# Patient Record
Sex: MALE | Race: WHITE | Employment: OTHER | URBAN - METROPOLITAN AREA
[De-identification: names, ages, dates, MRNs, and addresses within clinical notes are randomized per-mention and may not be internally consistent; named-entity substitution may affect disease eponyms.]

---

## 2017-04-21 ENCOUNTER — OFFICE VISIT (OUTPATIENT)
Dept: PULMONOLOGY | Age: 77
End: 2017-04-21

## 2017-04-21 VITALS
RESPIRATION RATE: 20 BRPM | OXYGEN SATURATION: 97 % | DIASTOLIC BLOOD PRESSURE: 67 MMHG | BODY MASS INDEX: 25.58 KG/M2 | HEART RATE: 86 BPM | WEIGHT: 173.2 LBS | SYSTOLIC BLOOD PRESSURE: 108 MMHG

## 2017-04-21 DIAGNOSIS — R91.1 PULMONARY NODULE: ICD-10-CM

## 2017-04-21 DIAGNOSIS — R05.4 COUGH SYNCOPE: ICD-10-CM

## 2017-04-21 DIAGNOSIS — Z72.0 TOBACCO ABUSE: ICD-10-CM

## 2017-04-21 DIAGNOSIS — J96.11 CHRONIC RESPIRATORY FAILURE WITH HYPOXIA (HCC): ICD-10-CM

## 2017-04-21 DIAGNOSIS — R55 COUGH SYNCOPE: ICD-10-CM

## 2017-04-21 DIAGNOSIS — J44.9 COPD, VERY SEVERE (HCC): Primary | ICD-10-CM

## 2017-04-21 PROCEDURE — G8926 SPIRO NO PERF OR DOC: HCPCS | Performed by: NURSE PRACTITIONER

## 2017-04-21 PROCEDURE — 3023F SPIROM DOC REV: CPT | Performed by: NURSE PRACTITIONER

## 2017-04-21 PROCEDURE — G8420 CALC BMI NORM PARAMETERS: HCPCS | Performed by: NURSE PRACTITIONER

## 2017-04-21 PROCEDURE — 1123F ACP DISCUSS/DSCN MKR DOCD: CPT | Performed by: NURSE PRACTITIONER

## 2017-04-21 PROCEDURE — 99214 OFFICE O/P EST MOD 30 MIN: CPT | Performed by: NURSE PRACTITIONER

## 2017-04-21 PROCEDURE — 4040F PNEUMOC VAC/ADMIN/RCVD: CPT | Performed by: NURSE PRACTITIONER

## 2017-04-21 PROCEDURE — G8427 DOCREV CUR MEDS BY ELIG CLIN: HCPCS | Performed by: NURSE PRACTITIONER

## 2017-04-21 PROCEDURE — 4004F PT TOBACCO SCREEN RCVD TLK: CPT | Performed by: NURSE PRACTITIONER

## 2017-04-21 RX ORDER — VARENICLINE TARTRATE 25 MG
KIT ORAL
Qty: 60 TABLET | Refills: 0 | Status: SHIPPED | OUTPATIENT
Start: 2017-04-21 | End: 2018-09-28 | Stop reason: ALTCHOICE

## 2017-04-21 RX ORDER — IPRATROPIUM BROMIDE AND ALBUTEROL SULFATE 2.5; .5 MG/3ML; MG/3ML
1 SOLUTION RESPIRATORY (INHALATION) EVERY 4 HOURS PRN
Qty: 360 ML | Refills: 5 | Status: SHIPPED | OUTPATIENT
Start: 2017-04-21 | End: 2019-01-02 | Stop reason: SDUPTHER

## 2017-04-21 RX ORDER — TIOTROPIUM BROMIDE 18 UG/1
18 CAPSULE ORAL; RESPIRATORY (INHALATION) DAILY
Qty: 30 CAPSULE | Refills: 6 | Status: SHIPPED | OUTPATIENT
Start: 2017-04-21 | End: 2018-09-28

## 2017-04-21 RX ORDER — BENZONATATE 100 MG/1
100 CAPSULE ORAL 3 TIMES DAILY PRN
Qty: 90 CAPSULE | Refills: 0 | Status: SHIPPED | OUTPATIENT
Start: 2017-04-21 | End: 2017-04-28

## 2017-05-10 ENCOUNTER — TELEPHONE (OUTPATIENT)
Dept: PULMONOLOGY | Age: 77
End: 2017-05-10

## 2018-09-28 ENCOUNTER — OFFICE VISIT (OUTPATIENT)
Dept: PULMONOLOGY | Age: 78
End: 2018-09-28
Payer: MEDICARE

## 2018-09-28 VITALS
DIASTOLIC BLOOD PRESSURE: 68 MMHG | WEIGHT: 173 LBS | BODY MASS INDEX: 25.55 KG/M2 | OXYGEN SATURATION: 95 % | SYSTOLIC BLOOD PRESSURE: 118 MMHG | HEART RATE: 85 BPM

## 2018-09-28 DIAGNOSIS — J96.11 CHRONIC RESPIRATORY FAILURE WITH HYPOXIA (HCC): ICD-10-CM

## 2018-09-28 DIAGNOSIS — J43.2 CENTRILOBULAR EMPHYSEMA (HCC): ICD-10-CM

## 2018-09-28 DIAGNOSIS — J44.9 COPD, VERY SEVERE (HCC): ICD-10-CM

## 2018-09-28 DIAGNOSIS — R06.02 SHORTNESS OF BREATH: Primary | ICD-10-CM

## 2018-09-28 DIAGNOSIS — Z72.0 TOBACCO ABUSE: ICD-10-CM

## 2018-09-28 DIAGNOSIS — R91.1 PULMONARY NODULE: ICD-10-CM

## 2018-09-28 PROCEDURE — 1101F PT FALLS ASSESS-DOCD LE1/YR: CPT | Performed by: INTERNAL MEDICINE

## 2018-09-28 PROCEDURE — 3023F SPIROM DOC REV: CPT | Performed by: INTERNAL MEDICINE

## 2018-09-28 PROCEDURE — 4004F PT TOBACCO SCREEN RCVD TLK: CPT | Performed by: INTERNAL MEDICINE

## 2018-09-28 PROCEDURE — 4040F PNEUMOC VAC/ADMIN/RCVD: CPT | Performed by: INTERNAL MEDICINE

## 2018-09-28 PROCEDURE — G8427 DOCREV CUR MEDS BY ELIG CLIN: HCPCS | Performed by: INTERNAL MEDICINE

## 2018-09-28 PROCEDURE — 1123F ACP DISCUSS/DSCN MKR DOCD: CPT | Performed by: INTERNAL MEDICINE

## 2018-09-28 PROCEDURE — G8419 CALC BMI OUT NRM PARAM NOF/U: HCPCS | Performed by: INTERNAL MEDICINE

## 2018-09-28 PROCEDURE — 99214 OFFICE O/P EST MOD 30 MIN: CPT | Performed by: INTERNAL MEDICINE

## 2018-09-28 PROCEDURE — G8926 SPIRO NO PERF OR DOC: HCPCS | Performed by: INTERNAL MEDICINE

## 2018-09-28 RX ORDER — TAMSULOSIN HYDROCHLORIDE 0.4 MG/1
1 CAPSULE ORAL DAILY
Refills: 0 | COMMUNITY
Start: 2018-09-20

## 2018-09-28 RX ORDER — GLIMEPIRIDE 2 MG/1
1 TABLET ORAL DAILY
Refills: 0 | COMMUNITY
Start: 2018-09-20 | End: 2019-02-18 | Stop reason: ALTCHOICE

## 2018-09-28 RX ORDER — LISINOPRIL AND HYDROCHLOROTHIAZIDE 12.5; 1 MG/1; MG/1
1 TABLET ORAL DAILY
Refills: 0 | COMMUNITY
Start: 2018-09-20 | End: 2019-02-18 | Stop reason: DRUGHIGH

## 2018-09-28 NOTE — LETTER
[facility], [address] [facility], [address] [place] [address]  Phone: [phone]  Fax: [phone]    No ref. provider found        September 28, 2018       Patient: Bladimir Au   MR Number: [id number]   YOB: 1940   Date of Visit: 9/28/2018       Dear Dr. Romi Vizcarra ref. provider found: Thank you for the request for consultation for Larissa Rocha to me for the evaluation of Severe COPD. Below are the relevant portions of my assessment and plan of care. If you have questions, please do not hesitate to call me. I look forward to following Gail Dee along with you.     Sincerely,        Lexi Manzo MD    CC providers:  No Recipients

## 2018-09-28 NOTE — PROGRESS NOTES
[facility]  Progress Note  Rhoda Bianchi MD       Renard Alert   YOB: 1940    Date of Visit:  9/28/2018    Assessment/Plan:  1. COPD, very severe ([address]  PFT:  INTERPRETATION: Spirometry showed normal FVC of 2.87 L, 81% of predicted,  and decreased FEV1 of 1.02 L, 37% predicted. FEV1/FVC ration was decreased  at 36%. No response to bronchodilators demonstrated on spirometry. Lung volumes showed increased total lung capacity of 157% predicted and  increased residual volume of 236% predicted. Diffusion capacity showed  normal DLCO of 79% predicted. IMPRESSION: Very severe obstructive defect with no response to  bronchodilators on spirometry. Both air trapping and hyperinflation were  present on lung volumes and normal diffusion capacity.  - Internal Referral to Pulmonary Rehab    Advair ==> in the [facility] and can't afford it. Give hime some samples and refer to [address] [place] [address] program.  Using Duoneb TID (partial treatments) and feels this helps him the most  Stop Spiriva  Albuterol    2. Chronic respiratory failure with hypoxia (HCC)  O2 sats are acceptable on supplemental O2. The patient benefits from the use of supplemental O2.      3. Centrilobular emphysema ([address]  CT chest:  IMPRESSION:   1. Mild to moderate emphysema without acute intra pulmonary findings. 2. 6 mm noncalcified nodule within the left lower lobe. While this most   likely represents a benign granuloma, further evaluation and workup is as   suggested per the Fleischner society criteria listed below. This may account   for the previously described outside chest x-ray finding. However, suggest   obtaining and direct comparison with this outside chest x-ray to characterize   the aforementioned pulmonary nodule on outside chest x-ray imaging. 4. Pulmonary nodule  CT chest:  IMPRESSION:   1. Mild to moderate emphysema without acute intra pulmonary findings.    2. 6 mm noncalcified

## 2019-01-02 ENCOUNTER — OFFICE VISIT (OUTPATIENT)
Dept: PULMONOLOGY | Age: 79
End: 2019-01-02
Payer: MEDICARE

## 2019-01-02 VITALS
BODY MASS INDEX: 23.78 KG/M2 | DIASTOLIC BLOOD PRESSURE: 68 MMHG | SYSTOLIC BLOOD PRESSURE: 100 MMHG | OXYGEN SATURATION: 95 % | WEIGHT: 161 LBS | HEART RATE: 86 BPM

## 2019-01-02 DIAGNOSIS — Z72.0 TOBACCO ABUSE: ICD-10-CM

## 2019-01-02 DIAGNOSIS — J96.11 CHRONIC RESPIRATORY FAILURE WITH HYPOXIA (HCC): ICD-10-CM

## 2019-01-02 DIAGNOSIS — R91.1 PULMONARY NODULE: ICD-10-CM

## 2019-01-02 DIAGNOSIS — J44.9 COPD, VERY SEVERE (HCC): Primary | ICD-10-CM

## 2019-01-02 DIAGNOSIS — J43.2 CENTRILOBULAR EMPHYSEMA (HCC): ICD-10-CM

## 2019-01-02 PROCEDURE — 99214 OFFICE O/P EST MOD 30 MIN: CPT | Performed by: INTERNAL MEDICINE

## 2019-01-02 PROCEDURE — 4004F PT TOBACCO SCREEN RCVD TLK: CPT | Performed by: INTERNAL MEDICINE

## 2019-01-02 PROCEDURE — 1101F PT FALLS ASSESS-DOCD LE1/YR: CPT | Performed by: INTERNAL MEDICINE

## 2019-01-02 PROCEDURE — G8420 CALC BMI NORM PARAMETERS: HCPCS | Performed by: INTERNAL MEDICINE

## 2019-01-02 PROCEDURE — G8484 FLU IMMUNIZE NO ADMIN: HCPCS | Performed by: INTERNAL MEDICINE

## 2019-01-02 PROCEDURE — 3023F SPIROM DOC REV: CPT | Performed by: INTERNAL MEDICINE

## 2019-01-02 PROCEDURE — G8926 SPIRO NO PERF OR DOC: HCPCS | Performed by: INTERNAL MEDICINE

## 2019-01-02 PROCEDURE — G8427 DOCREV CUR MEDS BY ELIG CLIN: HCPCS | Performed by: INTERNAL MEDICINE

## 2019-01-02 PROCEDURE — 4040F PNEUMOC VAC/ADMIN/RCVD: CPT | Performed by: INTERNAL MEDICINE

## 2019-01-02 PROCEDURE — 1123F ACP DISCUSS/DSCN MKR DOCD: CPT | Performed by: INTERNAL MEDICINE

## 2019-01-02 RX ORDER — BUDESONIDE 0.5 MG/2ML
500 INHALANT ORAL 2 TIMES DAILY
Qty: 60 AMPULE | Refills: 11 | Status: CANCELLED | OUTPATIENT
Start: 2019-01-02 | End: 2020-01-02

## 2019-01-02 RX ORDER — FORMOTEROL FUMARATE 20 UG/2ML
20 SOLUTION RESPIRATORY (INHALATION) 2 TIMES DAILY
Qty: 120 ML | Refills: 11 | Status: SHIPPED | OUTPATIENT
Start: 2019-01-02 | End: 2019-02-18

## 2019-01-02 RX ORDER — FORMOTEROL FUMARATE 20 UG/2ML
20 SOLUTION RESPIRATORY (INHALATION) 2 TIMES DAILY
Qty: 120 ML | Refills: 11 | Status: SHIPPED | OUTPATIENT
Start: 2019-01-02 | End: 2019-01-02 | Stop reason: SDUPTHER

## 2019-01-02 RX ORDER — FORMOTEROL FUMARATE 20 UG/2ML
20 SOLUTION RESPIRATORY (INHALATION) 2 TIMES DAILY
Qty: 120 ML | Refills: 11 | Status: SHIPPED | OUTPATIENT
Start: 2019-01-02 | End: 2019-01-04 | Stop reason: SDUPTHER

## 2019-01-02 RX ORDER — BUDESONIDE 0.5 MG/2ML
500 INHALANT ORAL 2 TIMES DAILY
Qty: 60 AMPULE | Refills: 11 | Status: SHIPPED | OUTPATIENT
Start: 2019-01-02 | End: 2019-01-04 | Stop reason: SDUPTHER

## 2019-01-02 RX ORDER — IPRATROPIUM BROMIDE AND ALBUTEROL SULFATE 2.5; .5 MG/3ML; MG/3ML
1 SOLUTION RESPIRATORY (INHALATION) EVERY 4 HOURS PRN
Qty: 360 ML | Refills: 11 | Status: SHIPPED | OUTPATIENT
Start: 2019-01-02

## 2019-01-02 RX ORDER — FORMOTEROL FUMARATE 20 UG/2ML
20 SOLUTION RESPIRATORY (INHALATION) 2 TIMES DAILY
Qty: 120 ML | Refills: 11 | Status: CANCELLED | OUTPATIENT
Start: 2019-01-02

## 2019-01-04 ENCOUNTER — TELEPHONE (OUTPATIENT)
Dept: PULMONOLOGY | Age: 79
End: 2019-01-04

## 2019-01-04 RX ORDER — BUDESONIDE 0.5 MG/2ML
500 INHALANT ORAL 2 TIMES DAILY
Qty: 60 AMPULE | Refills: 11 | Status: SHIPPED | OUTPATIENT
Start: 2019-01-04 | End: 2019-02-18

## 2019-01-04 RX ORDER — FORMOTEROL FUMARATE 20 UG/2ML
20 SOLUTION RESPIRATORY (INHALATION) 2 TIMES DAILY
Qty: 120 ML | Refills: 11 | Status: SHIPPED | OUTPATIENT
Start: 2019-01-04 | End: 2019-02-18

## 2019-02-11 ENCOUNTER — TELEPHONE (OUTPATIENT)
Dept: PULMONOLOGY | Age: 79
End: 2019-02-11

## 2019-02-18 ENCOUNTER — OFFICE VISIT (OUTPATIENT)
Dept: PULMONOLOGY | Age: 79
End: 2019-02-18
Payer: MEDICARE

## 2019-02-18 VITALS — SYSTOLIC BLOOD PRESSURE: 98 MMHG | OXYGEN SATURATION: 90 % | DIASTOLIC BLOOD PRESSURE: 56 MMHG | HEART RATE: 91 BPM

## 2019-02-18 DIAGNOSIS — J43.2 CENTRILOBULAR EMPHYSEMA (HCC): ICD-10-CM

## 2019-02-18 DIAGNOSIS — J44.9 COPD, VERY SEVERE (HCC): ICD-10-CM

## 2019-02-18 DIAGNOSIS — I10 ESSENTIAL HYPERTENSION: ICD-10-CM

## 2019-02-18 DIAGNOSIS — R91.1 PULMONARY NODULE: ICD-10-CM

## 2019-02-18 DIAGNOSIS — J96.11 CHRONIC RESPIRATORY FAILURE WITH HYPOXIA (HCC): ICD-10-CM

## 2019-02-18 DIAGNOSIS — R06.02 SHORTNESS OF BREATH: Primary | ICD-10-CM

## 2019-02-18 PROCEDURE — 4004F PT TOBACCO SCREEN RCVD TLK: CPT | Performed by: INTERNAL MEDICINE

## 2019-02-18 PROCEDURE — 1101F PT FALLS ASSESS-DOCD LE1/YR: CPT | Performed by: INTERNAL MEDICINE

## 2019-02-18 PROCEDURE — G8427 DOCREV CUR MEDS BY ELIG CLIN: HCPCS | Performed by: INTERNAL MEDICINE

## 2019-02-18 PROCEDURE — 4040F PNEUMOC VAC/ADMIN/RCVD: CPT | Performed by: INTERNAL MEDICINE

## 2019-02-18 PROCEDURE — 1123F ACP DISCUSS/DSCN MKR DOCD: CPT | Performed by: INTERNAL MEDICINE

## 2019-02-18 PROCEDURE — 3023F SPIROM DOC REV: CPT | Performed by: INTERNAL MEDICINE

## 2019-02-18 PROCEDURE — G8484 FLU IMMUNIZE NO ADMIN: HCPCS | Performed by: INTERNAL MEDICINE

## 2019-02-18 PROCEDURE — G8926 SPIRO NO PERF OR DOC: HCPCS | Performed by: INTERNAL MEDICINE

## 2019-02-18 PROCEDURE — G8420 CALC BMI NORM PARAMETERS: HCPCS | Performed by: INTERNAL MEDICINE

## 2019-02-18 PROCEDURE — 99214 OFFICE O/P EST MOD 30 MIN: CPT | Performed by: INTERNAL MEDICINE

## 2019-02-18 RX ORDER — ALBUTEROL SULFATE 90 UG/1
2 AEROSOL, METERED RESPIRATORY (INHALATION) EVERY 6 HOURS PRN
Qty: 1 INHALER | Refills: 11 | Status: SHIPPED | OUTPATIENT
Start: 2019-02-18 | End: 2019-06-17 | Stop reason: SDUPTHER

## 2019-02-18 RX ORDER — IPRATROPIUM BROMIDE AND ALBUTEROL SULFATE 2.5; .5 MG/3ML; MG/3ML
1 SOLUTION RESPIRATORY (INHALATION) EVERY 4 HOURS
Qty: 360 ML | Refills: 11 | Status: SHIPPED | OUTPATIENT
Start: 2019-02-18 | End: 2019-06-17 | Stop reason: SDUPTHER

## 2019-02-18 RX ORDER — LISINOPRIL AND HYDROCHLOROTHIAZIDE 25; 20 MG/1; MG/1
1 TABLET ORAL DAILY
COMMUNITY
End: 2019-09-17 | Stop reason: ALTCHOICE

## 2019-02-18 RX ORDER — PANTOPRAZOLE SODIUM 40 MG/1
40 TABLET, DELAYED RELEASE ORAL 2 TIMES DAILY
COMMUNITY

## 2019-02-18 RX ORDER — ROSUVASTATIN CALCIUM 20 MG/1
20 TABLET, COATED ORAL DAILY
COMMUNITY

## 2019-06-17 ENCOUNTER — OFFICE VISIT (OUTPATIENT)
Dept: PULMONOLOGY | Age: 79
End: 2019-06-17
Payer: MEDICARE

## 2019-06-17 VITALS
SYSTOLIC BLOOD PRESSURE: 121 MMHG | HEART RATE: 78 BPM | OXYGEN SATURATION: 94 % | DIASTOLIC BLOOD PRESSURE: 68 MMHG | WEIGHT: 152 LBS | BODY MASS INDEX: 22.45 KG/M2

## 2019-06-17 DIAGNOSIS — J44.9 COPD, VERY SEVERE (HCC): Primary | ICD-10-CM

## 2019-06-17 DIAGNOSIS — Z72.0 TOBACCO ABUSE: ICD-10-CM

## 2019-06-17 DIAGNOSIS — R91.1 PULMONARY NODULE: ICD-10-CM

## 2019-06-17 DIAGNOSIS — J96.11 CHRONIC RESPIRATORY FAILURE WITH HYPOXIA (HCC): ICD-10-CM

## 2019-06-17 DIAGNOSIS — J43.2 CENTRILOBULAR EMPHYSEMA (HCC): ICD-10-CM

## 2019-06-17 PROCEDURE — G8926 SPIRO NO PERF OR DOC: HCPCS | Performed by: INTERNAL MEDICINE

## 2019-06-17 PROCEDURE — 99214 OFFICE O/P EST MOD 30 MIN: CPT | Performed by: INTERNAL MEDICINE

## 2019-06-17 PROCEDURE — 4004F PT TOBACCO SCREEN RCVD TLK: CPT | Performed by: INTERNAL MEDICINE

## 2019-06-17 PROCEDURE — G8420 CALC BMI NORM PARAMETERS: HCPCS | Performed by: INTERNAL MEDICINE

## 2019-06-17 PROCEDURE — G8427 DOCREV CUR MEDS BY ELIG CLIN: HCPCS | Performed by: INTERNAL MEDICINE

## 2019-06-17 PROCEDURE — 1123F ACP DISCUSS/DSCN MKR DOCD: CPT | Performed by: INTERNAL MEDICINE

## 2019-06-17 PROCEDURE — 4040F PNEUMOC VAC/ADMIN/RCVD: CPT | Performed by: INTERNAL MEDICINE

## 2019-06-17 PROCEDURE — 3023F SPIROM DOC REV: CPT | Performed by: INTERNAL MEDICINE

## 2019-06-17 RX ORDER — ALBUTEROL SULFATE 90 UG/1
2 AEROSOL, METERED RESPIRATORY (INHALATION) EVERY 6 HOURS PRN
Qty: 1 INHALER | Refills: 11 | Status: SHIPPED | OUTPATIENT
Start: 2019-06-17 | End: 2019-09-17 | Stop reason: SDUPTHER

## 2019-06-17 RX ORDER — IPRATROPIUM BROMIDE AND ALBUTEROL SULFATE 2.5; .5 MG/3ML; MG/3ML
1 SOLUTION RESPIRATORY (INHALATION) EVERY 4 HOURS
Qty: 360 ML | Refills: 11 | Status: SHIPPED | OUTPATIENT
Start: 2019-06-17 | End: 2021-02-04

## 2019-06-17 NOTE — PROGRESS NOTES
[facility] [place]  Progress Note  Monty Arriaga MD       Jurgen Hearn   YOB: 1940    Date of Visit:  6/17/2019    Assessment/Plan:  1. COPD, very severe ([address]  PFT 2016:  INTERPRETATION: Spirometry showed normal FVC of 2.87 L, 81% of predicted,  and decreased FEV1 of 1.02 L, 37% predicted. FEV1/FVC ration was decreased  at 36%. No response to bronchodilators demonstrated on spirometry. Lung volumes showed increased total lung capacity of 157% predicted and  increased residual volume of 236% predicted. Diffusion capacity showed  normal DLCO of 79% predicted. IMPRESSION: Very severe obstructive defect with no response to  bronchodilators on spirometry. Both air trapping and hyperinflation were  present on lung volumes and normal diffusion capacity.  - Internal Referral to Pulmonary Rehab    Advair (acces to medication is an issue 2/2 cost)  Using Duoneb QID  Albuterol    Repeat PFT was ordered but he was never able to get it done. 2. Chronic respiratory failure with hypoxia (HCC)  O2 sats are acceptable on supplemental O2. The patient benefits from the use of supplemental O2. Use O2 at 3 on his conserver when active and 2 when he is at rest. He is on 2.5 on the concentrator at home. 3. Centrilobular emphysema ([address]  CT chest:  IMPRESSION:   1. Mild to moderate emphysema without acute intra pulmonary findings. 2. 6 mm noncalcified nodule within the left lower lobe. While this most   likely represents a benign granuloma, further evaluation and workup is as   suggested per the Fleischner society criteria listed below. This may account   for the previously described outside chest x-ray finding. However, suggest   obtaining and direct comparison with this outside chest x-ray to characterize   the aforementioned pulmonary nodule on outside chest x-ray imaging. 4. Pulmonary nodule  CT chest:  IMPRESSION:   1.  Mild to moderate emphysema without

## 2019-09-17 ENCOUNTER — OFFICE VISIT (OUTPATIENT)
Dept: PULMONOLOGY | Age: 79
End: 2019-09-17
Payer: MEDICARE

## 2019-09-17 VITALS — HEART RATE: 90 BPM | SYSTOLIC BLOOD PRESSURE: 116 MMHG | OXYGEN SATURATION: 96 % | DIASTOLIC BLOOD PRESSURE: 64 MMHG

## 2019-09-17 DIAGNOSIS — J96.11 CHRONIC RESPIRATORY FAILURE WITH HYPOXIA (HCC): ICD-10-CM

## 2019-09-17 DIAGNOSIS — R91.1 PULMONARY NODULE: ICD-10-CM

## 2019-09-17 DIAGNOSIS — J43.2 CENTRILOBULAR EMPHYSEMA (HCC): ICD-10-CM

## 2019-09-17 DIAGNOSIS — Z72.0 TOBACCO ABUSE: ICD-10-CM

## 2019-09-17 DIAGNOSIS — J44.9 COPD, VERY SEVERE (HCC): Primary | ICD-10-CM

## 2019-09-17 PROCEDURE — 99214 OFFICE O/P EST MOD 30 MIN: CPT | Performed by: INTERNAL MEDICINE

## 2019-09-17 PROCEDURE — 4040F PNEUMOC VAC/ADMIN/RCVD: CPT | Performed by: INTERNAL MEDICINE

## 2019-09-17 PROCEDURE — 3023F SPIROM DOC REV: CPT | Performed by: INTERNAL MEDICINE

## 2019-09-17 PROCEDURE — 4004F PT TOBACCO SCREEN RCVD TLK: CPT | Performed by: INTERNAL MEDICINE

## 2019-09-17 PROCEDURE — 1123F ACP DISCUSS/DSCN MKR DOCD: CPT | Performed by: INTERNAL MEDICINE

## 2019-09-17 PROCEDURE — G8420 CALC BMI NORM PARAMETERS: HCPCS | Performed by: INTERNAL MEDICINE

## 2019-09-17 PROCEDURE — G8427 DOCREV CUR MEDS BY ELIG CLIN: HCPCS | Performed by: INTERNAL MEDICINE

## 2019-09-17 PROCEDURE — G8926 SPIRO NO PERF OR DOC: HCPCS | Performed by: INTERNAL MEDICINE

## 2019-09-17 RX ORDER — ALBUTEROL SULFATE 90 UG/1
2 AEROSOL, METERED RESPIRATORY (INHALATION) EVERY 6 HOURS PRN
Qty: 1 INHALER | Refills: 11 | Status: SHIPPED | OUTPATIENT
Start: 2019-09-17

## 2019-10-16 ENCOUNTER — OFFICE VISIT (OUTPATIENT)
Dept: PULMONOLOGY | Age: 79
End: 2019-10-16
Payer: MEDICARE

## 2019-10-16 VITALS — OXYGEN SATURATION: 100 % | SYSTOLIC BLOOD PRESSURE: 131 MMHG | DIASTOLIC BLOOD PRESSURE: 71 MMHG | HEART RATE: 64 BPM

## 2019-10-16 DIAGNOSIS — J43.2 CENTRILOBULAR EMPHYSEMA (HCC): ICD-10-CM

## 2019-10-16 DIAGNOSIS — G47.33 OSA (OBSTRUCTIVE SLEEP APNEA): ICD-10-CM

## 2019-10-16 DIAGNOSIS — Z72.0 TOBACCO ABUSE: ICD-10-CM

## 2019-10-16 DIAGNOSIS — R91.1 PULMONARY NODULE: ICD-10-CM

## 2019-10-16 DIAGNOSIS — J44.9 COPD, VERY SEVERE (HCC): Primary | ICD-10-CM

## 2019-10-16 DIAGNOSIS — J96.11 CHRONIC RESPIRATORY FAILURE WITH HYPOXIA (HCC): ICD-10-CM

## 2019-10-16 PROCEDURE — G8420 CALC BMI NORM PARAMETERS: HCPCS | Performed by: INTERNAL MEDICINE

## 2019-10-16 PROCEDURE — G8926 SPIRO NO PERF OR DOC: HCPCS | Performed by: INTERNAL MEDICINE

## 2019-10-16 PROCEDURE — 99214 OFFICE O/P EST MOD 30 MIN: CPT | Performed by: INTERNAL MEDICINE

## 2019-10-16 PROCEDURE — G8484 FLU IMMUNIZE NO ADMIN: HCPCS | Performed by: INTERNAL MEDICINE

## 2019-10-16 PROCEDURE — 1123F ACP DISCUSS/DSCN MKR DOCD: CPT | Performed by: INTERNAL MEDICINE

## 2019-10-16 PROCEDURE — 4004F PT TOBACCO SCREEN RCVD TLK: CPT | Performed by: INTERNAL MEDICINE

## 2019-10-16 PROCEDURE — G8427 DOCREV CUR MEDS BY ELIG CLIN: HCPCS | Performed by: INTERNAL MEDICINE

## 2019-10-16 PROCEDURE — 3023F SPIROM DOC REV: CPT | Performed by: INTERNAL MEDICINE

## 2019-10-16 PROCEDURE — 4040F PNEUMOC VAC/ADMIN/RCVD: CPT | Performed by: INTERNAL MEDICINE

## 2019-10-16 RX ORDER — PREDNISONE 20 MG/1
20 TABLET ORAL DAILY
Qty: 30 TABLET | Refills: 2 | Status: SHIPPED | OUTPATIENT
Start: 2019-10-16 | End: 2019-11-15

## 2019-10-16 RX ORDER — LEVOFLOXACIN 750 MG/1
750 TABLET ORAL DAILY
Qty: 21 TABLET | Refills: 1 | Status: SHIPPED | OUTPATIENT
Start: 2019-10-16 | End: 2019-11-06

## 2019-10-25 ENCOUNTER — TELEPHONE (OUTPATIENT)
Dept: PULMONOLOGY | Age: 79
End: 2019-10-25

## 2019-11-15 ENCOUNTER — TELEPHONE (OUTPATIENT)
Dept: PULMONOLOGY | Age: 79
End: 2019-11-15

## 2019-11-19 ENCOUNTER — OFFICE VISIT (OUTPATIENT)
Dept: PULMONOLOGY | Age: 79
End: 2019-11-19
Payer: MEDICARE

## 2019-11-19 VITALS — DIASTOLIC BLOOD PRESSURE: 65 MMHG | SYSTOLIC BLOOD PRESSURE: 114 MMHG | OXYGEN SATURATION: 92 % | HEART RATE: 66 BPM

## 2019-11-19 DIAGNOSIS — R91.1 PULMONARY NODULE: ICD-10-CM

## 2019-11-19 DIAGNOSIS — Z72.0 TOBACCO ABUSE: ICD-10-CM

## 2019-11-19 DIAGNOSIS — J96.11 CHRONIC RESPIRATORY FAILURE WITH HYPOXIA (HCC): ICD-10-CM

## 2019-11-19 DIAGNOSIS — J44.9 COPD, VERY SEVERE (HCC): Primary | ICD-10-CM

## 2019-11-19 DIAGNOSIS — J43.2 CENTRILOBULAR EMPHYSEMA (HCC): ICD-10-CM

## 2019-11-19 PROCEDURE — G8484 FLU IMMUNIZE NO ADMIN: HCPCS | Performed by: INTERNAL MEDICINE

## 2019-11-19 PROCEDURE — 4040F PNEUMOC VAC/ADMIN/RCVD: CPT | Performed by: INTERNAL MEDICINE

## 2019-11-19 PROCEDURE — 4004F PT TOBACCO SCREEN RCVD TLK: CPT | Performed by: INTERNAL MEDICINE

## 2019-11-19 PROCEDURE — 99214 OFFICE O/P EST MOD 30 MIN: CPT | Performed by: INTERNAL MEDICINE

## 2019-11-19 PROCEDURE — 3023F SPIROM DOC REV: CPT | Performed by: INTERNAL MEDICINE

## 2019-11-19 PROCEDURE — G8427 DOCREV CUR MEDS BY ELIG CLIN: HCPCS | Performed by: INTERNAL MEDICINE

## 2019-11-19 PROCEDURE — 1123F ACP DISCUSS/DSCN MKR DOCD: CPT | Performed by: INTERNAL MEDICINE

## 2019-11-19 PROCEDURE — G8926 SPIRO NO PERF OR DOC: HCPCS | Performed by: INTERNAL MEDICINE

## 2019-11-19 PROCEDURE — G8420 CALC BMI NORM PARAMETERS: HCPCS | Performed by: INTERNAL MEDICINE

## 2019-12-03 ENCOUNTER — TELEPHONE (OUTPATIENT)
Dept: PULMONOLOGY | Age: 79
End: 2019-12-03

## 2019-12-10 ENCOUNTER — OFFICE VISIT (OUTPATIENT)
Dept: PULMONOLOGY | Age: 79
End: 2019-12-10
Payer: MEDICARE

## 2019-12-10 VITALS — DIASTOLIC BLOOD PRESSURE: 79 MMHG | HEART RATE: 100 BPM | SYSTOLIC BLOOD PRESSURE: 135 MMHG | OXYGEN SATURATION: 97 %

## 2019-12-10 DIAGNOSIS — J44.9 COPD, VERY SEVERE (HCC): Primary | ICD-10-CM

## 2019-12-10 DIAGNOSIS — J43.2 CENTRILOBULAR EMPHYSEMA (HCC): ICD-10-CM

## 2019-12-10 DIAGNOSIS — J15.1 PNEUMONIA OF LEFT UPPER LOBE DUE TO PSEUDOMONAS SPECIES (HCC): ICD-10-CM

## 2019-12-10 DIAGNOSIS — J96.11 CHRONIC RESPIRATORY FAILURE WITH HYPOXIA (HCC): ICD-10-CM

## 2019-12-10 PROCEDURE — 1123F ACP DISCUSS/DSCN MKR DOCD: CPT | Performed by: INTERNAL MEDICINE

## 2019-12-10 PROCEDURE — 4040F PNEUMOC VAC/ADMIN/RCVD: CPT | Performed by: INTERNAL MEDICINE

## 2019-12-10 PROCEDURE — G8420 CALC BMI NORM PARAMETERS: HCPCS | Performed by: INTERNAL MEDICINE

## 2019-12-10 PROCEDURE — 99214 OFFICE O/P EST MOD 30 MIN: CPT | Performed by: INTERNAL MEDICINE

## 2019-12-10 PROCEDURE — G8926 SPIRO NO PERF OR DOC: HCPCS | Performed by: INTERNAL MEDICINE

## 2019-12-10 PROCEDURE — 4004F PT TOBACCO SCREEN RCVD TLK: CPT | Performed by: INTERNAL MEDICINE

## 2019-12-10 PROCEDURE — 3023F SPIROM DOC REV: CPT | Performed by: INTERNAL MEDICINE

## 2019-12-10 PROCEDURE — G8427 DOCREV CUR MEDS BY ELIG CLIN: HCPCS | Performed by: INTERNAL MEDICINE

## 2019-12-10 PROCEDURE — G8484 FLU IMMUNIZE NO ADMIN: HCPCS | Performed by: INTERNAL MEDICINE

## 2019-12-10 RX ORDER — LANOLIN ALCOHOL/MO/W.PET/CERES
500 CREAM (GRAM) TOPICAL NIGHTLY
COMMUNITY

## 2019-12-10 RX ORDER — ASPIRIN 325 MG
325 TABLET ORAL DAILY
COMMUNITY

## 2019-12-10 RX ORDER — HYDROCODONE BITARTRATE AND ACETAMINOPHEN 5; 325 MG/1; MG/1
1 TABLET ORAL EVERY 6 HOURS PRN
COMMUNITY

## 2020-01-08 ENCOUNTER — TELEPHONE (OUTPATIENT)
Dept: PULMONOLOGY | Age: 80
End: 2020-01-08

## 2020-01-08 NOTE — TELEPHONE ENCOUNTER
Dr Fifi Seals did not order home care. This was ordered while [facility] was [facility].  Kathie oviedo

## 2020-02-19 ENCOUNTER — OFFICE VISIT (OUTPATIENT)
Dept: PULMONOLOGY | Age: 80
End: 2020-02-19
Payer: MEDICARE

## 2020-02-19 VITALS — OXYGEN SATURATION: 94 % | HEART RATE: 98 BPM | SYSTOLIC BLOOD PRESSURE: 129 MMHG | DIASTOLIC BLOOD PRESSURE: 77 MMHG

## 2020-02-19 PROBLEM — T17.908A ASPIRATION INTO AIRWAY: Status: ACTIVE | Noted: 2020-02-19

## 2020-02-19 PROCEDURE — 99214 OFFICE O/P EST MOD 30 MIN: CPT | Performed by: INTERNAL MEDICINE

## 2020-02-19 PROCEDURE — 3023F SPIROM DOC REV: CPT | Performed by: INTERNAL MEDICINE

## 2020-02-19 PROCEDURE — G8484 FLU IMMUNIZE NO ADMIN: HCPCS | Performed by: INTERNAL MEDICINE

## 2020-02-19 PROCEDURE — G8926 SPIRO NO PERF OR DOC: HCPCS | Performed by: INTERNAL MEDICINE

## 2020-02-19 PROCEDURE — G8420 CALC BMI NORM PARAMETERS: HCPCS | Performed by: INTERNAL MEDICINE

## 2020-02-19 PROCEDURE — 4040F PNEUMOC VAC/ADMIN/RCVD: CPT | Performed by: INTERNAL MEDICINE

## 2020-02-19 PROCEDURE — G8427 DOCREV CUR MEDS BY ELIG CLIN: HCPCS | Performed by: INTERNAL MEDICINE

## 2020-02-19 PROCEDURE — 1123F ACP DISCUSS/DSCN MKR DOCD: CPT | Performed by: INTERNAL MEDICINE

## 2020-02-19 PROCEDURE — 4004F PT TOBACCO SCREEN RCVD TLK: CPT | Performed by: INTERNAL MEDICINE

## 2020-02-19 RX ORDER — ALBUTEROL SULFATE 90 UG/1
2 AEROSOL, METERED RESPIRATORY (INHALATION) EVERY 6 HOURS PRN
Qty: 1 INHALER | Refills: 11 | Status: SHIPPED | OUTPATIENT
Start: 2020-02-19 | End: 2021-02-18

## 2020-02-19 NOTE — PROGRESS NOTES
reviewed radiographic images for this patient as part of this visit. CT shows ANA LUISA has cleared but he now has infiltrate in the LL and the RLL not present on the previous study. I think he is aspirating  Have him get MBS, wants to go to [place] because he lives so close. 5. Tobacco abuse  Stable  Again had a long discussion about the importance of complete smoking cessation.m  Struggling with quitting. Still smoking but less        FOLLOW UP: 3 months    HPI  The patient presents with a chief complaint of severe shortness of breath related to very severe COPD of many years duration. He has mild associated cough. Exertion is a modifying factor. He feels like his SOB is getting worse. He does use BiPAP at night with sleep. He is on O2 24 hours per day. He has even \"cut down on smoking\". No Chest pain, Nausea or vomiting reported      Review of Systems  As documented in HPI     No Known Allergies  Prior to Visit Medications    Medication Sig Taking? Authorizing Provider   HYDROcodone-acetaminophen (NORCO) 5-325 MG per tablet Take 1 tablet by mouth every 6 hours as needed for Pain.   Historical Provider, MD   Insulin Aspart (NOVOLOG FLEXPEN SC) Inject into the skin  Historical Provider, MD   aspirin 325 MG tablet Take 325 mg by mouth daily  Historical Provider, MD   niacin 500 MG extended release capsule Take 500 mg by mouth nightly  Historical Provider, MD   umeclidinium-vilanterol (ANORO ELLIPTA) 62.5-25 MCG/INH AEPB inhaler Inhale 1 puff into the lungs daily  Pippa Nguyen MD   albuterol sulfate HFA (PROAIR HFA) 108 (90 Base) MCG/ACT inhaler Inhale 2 puffs into the lungs every 6 hours as needed for Wheezing  Pippa Nguyen MD   fluticasone-salmeterol (ADVAIR DISKUS) 250-50 MCG/DOSE AEPB Inhale 1 puff into the lungs 2 times daily  Pippa Nguyen MD   ipratropium-albuterol (DUONEB) 0.5-2.5 (3) MG/3ML SOLN nebulizer solution Inhale 3 mLs into the lungs every 4 hours DX:COPD J44.9  Pippa Nguyen MD

## 2020-03-10 ENCOUNTER — TELEPHONE (OUTPATIENT)
Dept: PULMONOLOGY | Age: 80
End: 2020-03-10

## 2020-03-10 NOTE — TELEPHONE ENCOUNTER
We have no sleep studies on pt and we were not ordering physician for this. Called and spoke with Mrs Griselda Pyo and pt was set up on a bi-pap upon discharge from [facility] 2/2019. [facility]. She was told today that they need a new order sent for pt to get supplies for his Bi-pap. Faxed this and Mrs Griselda Pyo aware.

## 2020-03-10 NOTE — TELEPHONE ENCOUNTER
Pt's wife called in stating that they are needing a new prescription for Pt's bi-pap machine.      Pt # [phone]

## 2020-05-26 ENCOUNTER — VIRTUAL VISIT (OUTPATIENT)
Dept: PULMONOLOGY | Age: 80
End: 2020-05-26
Payer: MEDICARE

## 2020-05-26 VITALS
OXYGEN SATURATION: 96 % | BODY MASS INDEX: 20.08 KG/M2 | HEART RATE: 93 BPM | WEIGHT: 136 LBS | SYSTOLIC BLOOD PRESSURE: 100 MMHG | DIASTOLIC BLOOD PRESSURE: 43 MMHG

## 2020-05-26 PROCEDURE — 3023F SPIROM DOC REV: CPT | Performed by: INTERNAL MEDICINE

## 2020-05-26 PROCEDURE — 4004F PT TOBACCO SCREEN RCVD TLK: CPT | Performed by: INTERNAL MEDICINE

## 2020-05-26 PROCEDURE — G8420 CALC BMI NORM PARAMETERS: HCPCS | Performed by: INTERNAL MEDICINE

## 2020-05-26 PROCEDURE — 1123F ACP DISCUSS/DSCN MKR DOCD: CPT | Performed by: INTERNAL MEDICINE

## 2020-05-26 PROCEDURE — G8926 SPIRO NO PERF OR DOC: HCPCS | Performed by: INTERNAL MEDICINE

## 2020-05-26 PROCEDURE — 99214 OFFICE O/P EST MOD 30 MIN: CPT | Performed by: INTERNAL MEDICINE

## 2020-05-26 PROCEDURE — G8427 DOCREV CUR MEDS BY ELIG CLIN: HCPCS | Performed by: INTERNAL MEDICINE

## 2020-05-26 PROCEDURE — 4040F PNEUMOC VAC/ADMIN/RCVD: CPT | Performed by: INTERNAL MEDICINE

## 2020-05-26 NOTE — PROGRESS NOTES
[facility] [place]  Progress Note  Deedee Greenwood MD    Pulmonology Video Visit    Pursuant to the emergency declaration under the [address] [facility] waiver authority and the Coronavirus Preparedness and Response Supplemental Appropriations Act this Video Visit was insisted, with patient's consent, to reduce the patient's risk of exposure to COVID-19 and provide continuity of care for an established patient. The patient was at home, while the provider was at the clinic. Services were provided through a synchronous discussion through a Video Visit to substitute for in-person clinic visit, and coded as such. Nehemiah An   YOB: 1940    Date of Visit:  5/26/2020    Assessment/Plan:  1. COPD, very severe ([address]  Stable  PFT 2016:  INTERPRETATION: Spirometry showed normal FVC of 2.87 L, 81% of predicted,  and decreased FEV1 of 1.02 L, 37% predicted. FEV1/FVC ration was decreased  at 36%. No response to bronchodilators demonstrated on spirometry. Lung volumes showed increased total lung capacity of 157% predicted and  increased residual volume of 236% predicted. Diffusion capacity showed  normal DLCO of 79% predicted. IMPRESSION: Very severe obstructive defect with no response to  bronchodilators on spirometry. Both air trapping and hyperinflation were  present on lung volumes and normal diffusion capacity. Anoro (access to medication is an issue 2/2 cost)  Duoneb QID    With his recent pneumonia, change Advair to Anoro. 2. Chronic respiratory failure with hypoxia (HCC)  Stable  O2 sats are acceptable on supplemental O2. The patient benefits from the use of supplemental O2. Use O2 at 3 on his conserver when active and 2 when he is at rest. He is on 2.5 on the concentrator at home. 3. Centrilobular emphysema ([address]  Stable  CT chest 9/19:  IMPRESSION:   1.  Mild to moderate emphysema without acute intra pulmonary

## 2020-12-07 ENCOUNTER — VIRTUAL VISIT (OUTPATIENT)
Dept: PULMONOLOGY | Age: 80
End: 2020-12-07
Payer: MEDICARE

## 2020-12-07 PROCEDURE — 1123F ACP DISCUSS/DSCN MKR DOCD: CPT | Performed by: INTERNAL MEDICINE

## 2020-12-07 PROCEDURE — 4040F PNEUMOC VAC/ADMIN/RCVD: CPT | Performed by: INTERNAL MEDICINE

## 2020-12-07 PROCEDURE — G8484 FLU IMMUNIZE NO ADMIN: HCPCS | Performed by: INTERNAL MEDICINE

## 2020-12-07 PROCEDURE — 4004F PT TOBACCO SCREEN RCVD TLK: CPT | Performed by: INTERNAL MEDICINE

## 2020-12-07 PROCEDURE — 3023F SPIROM DOC REV: CPT | Performed by: INTERNAL MEDICINE

## 2020-12-07 PROCEDURE — G8427 DOCREV CUR MEDS BY ELIG CLIN: HCPCS | Performed by: INTERNAL MEDICINE

## 2020-12-07 PROCEDURE — 99214 OFFICE O/P EST MOD 30 MIN: CPT | Performed by: INTERNAL MEDICINE

## 2020-12-07 PROCEDURE — G8420 CALC BMI NORM PARAMETERS: HCPCS | Performed by: INTERNAL MEDICINE

## 2020-12-07 PROCEDURE — G8926 SPIRO NO PERF OR DOC: HCPCS | Performed by: INTERNAL MEDICINE

## 2020-12-07 RX ORDER — UMECLIDINIUM BROMIDE AND VILANTEROL TRIFENATATE 62.5; 25 UG/1; UG/1
1 POWDER RESPIRATORY (INHALATION) DAILY
Qty: 3 EACH | Refills: 3 | Status: SHIPPED | OUTPATIENT
Start: 2020-12-07 | End: 2021-06-07

## 2020-12-07 RX ORDER — UMECLIDINIUM BROMIDE AND VILANTEROL TRIFENATATE 62.5; 25 UG/1; UG/1
1 POWDER RESPIRATORY (INHALATION) DAILY
Qty: 1 EACH | Refills: 6 | Status: SHIPPED | OUTPATIENT
Start: 2020-12-07 | End: 2021-06-07

## 2020-12-07 NOTE — PROGRESS NOTES
[facility] [place]  Progress Note  Raul Hilario MD    Pulmonology Video Visit    Pursuant to the emergency declaration under the [address] [facility] waiver authority and the Coronavirus Preparedness and Response Supplemental Appropriations Act this Video Visit was insisted, with patient's consent, to reduce the patient's risk of exposure to COVID-19 and provide continuity of care for an established patient. The patient was at home, while the provider was at the clinic. Services were provided through a synchronous discussion through a Video Visit to substitute for in-person clinic visit, and coded as such. Gavin Kingston   YOB: 1940    Date of Visit:  12/7/2020    Assessment/Plan:  1. COPD, very severe ([address]  Stable  PFT 2016:  INTERPRETATION: Spirometry showed normal FVC of 2.87 L, 81% of predicted,  and decreased FEV1 of 1.02 L, 37% predicted. FEV1/FVC ration was decreased  at 36%. No response to bronchodilators demonstrated on spirometry. Lung volumes showed increased total lung capacity of 157% predicted and  increased residual volume of 236% predicted. Diffusion capacity showed  normal DLCO of 79% predicted. IMPRESSION: Very severe obstructive defect with no response to  bronchodilators on spirometry. Both air trapping and hyperinflation were  present on lung volumes and normal diffusion capacity. Anoro (access to medication is an issue 2/2 cost)  Duoneb QID      2. Chronic respiratory failure with hypoxia (HCC)  Stable  O2 sats are acceptable on supplemental O2. The patient benefits from the use of supplemental O2. Use O2 at 3 on his conserver when active and 2 when he is at rest. He is on 2.5 on the concentrator at home. 3. Centrilobular emphysema ([address]  Stable  CT chest 9/19:  IMPRESSION:   1. Mild to moderate emphysema without acute intra pulmonary findings.    2. 6 mm noncalcified nodule within the left lower lobe. While this most   likely represents a benign granuloma, further evaluation and workup is as   suggested per the Fleischner society criteria listed below. This may account   for the previously described outside chest x-ray finding. However, suggest   obtaining and direct comparison with this outside chest x-ray to characterize   the aforementioned pulmonary nodule on outside chest x-ray imaging. 4. Aspiration into Airway  Stable  CT chest 1/20:  [IMPRESSION]         Bands of increased parenchymal density in the right upper lobe and apex,    with significant increased density in the left lower lobe, and posterior    peripheral right lower lobe. Findings are consistent with acute    inflammatory changes/infiltrate. There are associated cystic changes in    the left lower lobe.      Interval significant resolution of patchy density in the anterior left    upper lobe since the prior study.         There are significant diffuse underlying emphysematous changes    bilaterally. Rpeat CT he wants to get it at [facility]    5. Tobacco abuse  Stable  Again had a long discussion about the importance of complete smoking cessation.m  Struggling with quitting. Still smoking but less    FOLLOW UP: 6 months    HPI  The patient presents with a chief complaint of severe shortness of breath related to very severe COPD of many years duration. He has mild associated cough. Exertion is a modifying factor. He feels like his SOB is getting worse. He does use BiPAP at night with sleep. He is on O2 24 hours per day. He ran out of his Anoro. He still smokes. Review of Systems  No Chest pain, Nausea or vomiting reported      No Known Allergies  Prior to Visit Medications    Medication Sig Taking?  Authorizing Provider   umeclidinium-vilanterol (ANORO ELLIPTA) 62.5-25 MCG/INH AEPB inhaler Inhale 1 puff into the lungs daily Yes Lala Daniel MD   albuterol sulfate  (90 Base) MCG/ACT inhaler Inhale 2 puffs into the lungs every 6 hours as needed for Wheezing  Elaina Andrade MD   umeclidinium-vilanterol (ANORO ELLIPTA) 62.5-25 MCG/INH AEPB inhaler Inhale 1 puff into the lungs daily  Elaina Andrade MD   HYDROcodone-acetaminophen (NORCO) 5-325 MG per tablet Take 1 tablet by mouth every 6 hours as needed for Pain. Historical Provider, MD   Insulin Aspart (NOVOLOG FLEXPEN SC) Inject into the skin  Historical Provider, MD   aspirin 325 MG tablet Take 325 mg by mouth daily  Historical Provider, MD   niacin 500 MG extended release capsule Take 500 mg by mouth nightly  Historical Provider, MD   umeclidinium-vilanterol (ANORO ELLIPTA) 62.5-25 MCG/INH AEPB inhaler Inhale 1 puff into the lungs daily  Elaina Andrade MD   albuterol sulfate HFA (PROAIR HFA) 108 (90 Base) MCG/ACT inhaler Inhale 2 puffs into the lungs every 6 hours as needed for Wheezing  Elaina Andrade MD   ipratropium-albuterol (DUONEB) 0.5-2.5 (3) MG/3ML SOLN nebulizer solution Inhale 3 mLs into the lungs every 4 hours DX:COPD J44.9  Elaina Andrade MD   pantoprazole (PROTONIX) 40 MG tablet Take 40 mg by mouth daily  Historical Provider, MD   metFORMIN (GLUCOPHAGE) 500 MG tablet Take 500 mg by mouth 2 times daily (with meals)  Historical Provider, MD   rosuvastatin (CRESTOR) 20 MG tablet Take 20 mg by mouth daily  Historical Provider, MD   ipratropium-albuterol (DUONEB) 0.5-2.5 (3) MG/3ML SOLN nebulizer solution Inhale 3 mLs into the lungs every 4 hours as needed for Shortness of Breath DX:COPD J44.9  Elaina Andrade MD   tamsulosin (FLOMAX) 0.4 MG capsule Take 1 capsule by mouth daily  Historical Provider, MD   OXYGEN Inhale into the lungs At night. Historical Provider, MD       There were no vitals filed for this visit. There is no height or weight on file to calculate BMI.      Wt Readings from Last 3 Encounters:   05/26/20 136 lb (61.7 kg)   06/17/19 152 lb (68.9 kg)   01/02/19 161 lb (73 kg)     BP Readings from Last 3 Encounters: 05/26/20 (!) 100/43   02/19/20 129/77   12/10/19 135/79        Social History     Tobacco Use   Smoking Status Current Every Day Smoker    Packs/day: 1.50    Years: 60.00    Pack years: 90.00   Smokeless Tobacco Never Used       Physical Exam:    Video visit

## 2021-02-04 RX ORDER — IPRATROPIUM BROMIDE AND ALBUTEROL SULFATE 2.5; .5 MG/3ML; MG/3ML
SOLUTION RESPIRATORY (INHALATION)
Qty: 120 VIAL | Refills: 10 | Status: SHIPPED | OUTPATIENT
Start: 2021-02-04

## 2021-03-18 ENCOUNTER — TELEPHONE (OUTPATIENT)
Dept: PULMONOLOGY | Age: 81
End: 2021-03-18

## 2021-03-18 NOTE — TELEPHONE ENCOUNTER
Rubens Lowe called with an update on pt.    crackles through out lungs  coughing helped some  productive cough clear  98% 2.5 liters  resp 16  has been doing nuebulizer and inhalers  Still smoking

## 2021-06-07 ENCOUNTER — OFFICE VISIT (OUTPATIENT)
Dept: PULMONOLOGY | Age: 81
End: 2021-06-07
Payer: MEDICARE

## 2021-06-07 VITALS — HEART RATE: 84 BPM | OXYGEN SATURATION: 98 %

## 2021-06-07 DIAGNOSIS — Z72.0 TOBACCO ABUSE: ICD-10-CM

## 2021-06-07 DIAGNOSIS — J43.2 CENTRILOBULAR EMPHYSEMA (HCC): ICD-10-CM

## 2021-06-07 DIAGNOSIS — J96.11 CHRONIC RESPIRATORY FAILURE WITH HYPOXIA (HCC): ICD-10-CM

## 2021-06-07 DIAGNOSIS — R06.02 SHORTNESS OF BREATH: Primary | ICD-10-CM

## 2021-06-07 DIAGNOSIS — J44.9 COPD, VERY SEVERE (HCC): ICD-10-CM

## 2021-06-07 PROCEDURE — 1123F ACP DISCUSS/DSCN MKR DOCD: CPT | Performed by: INTERNAL MEDICINE

## 2021-06-07 PROCEDURE — 3023F SPIROM DOC REV: CPT | Performed by: INTERNAL MEDICINE

## 2021-06-07 PROCEDURE — G8421 BMI NOT CALCULATED: HCPCS | Performed by: INTERNAL MEDICINE

## 2021-06-07 PROCEDURE — 99214 OFFICE O/P EST MOD 30 MIN: CPT | Performed by: INTERNAL MEDICINE

## 2021-06-07 PROCEDURE — 4040F PNEUMOC VAC/ADMIN/RCVD: CPT | Performed by: INTERNAL MEDICINE

## 2021-06-07 PROCEDURE — 4004F PT TOBACCO SCREEN RCVD TLK: CPT | Performed by: INTERNAL MEDICINE

## 2021-06-07 PROCEDURE — G8427 DOCREV CUR MEDS BY ELIG CLIN: HCPCS | Performed by: INTERNAL MEDICINE

## 2021-06-07 PROCEDURE — G8926 SPIRO NO PERF OR DOC: HCPCS | Performed by: INTERNAL MEDICINE

## 2021-06-07 RX ORDER — CARVEDILOL 6.25 MG/1
6.25 TABLET ORAL 2 TIMES DAILY WITH MEALS
COMMUNITY

## 2021-06-07 RX ORDER — FUROSEMIDE 40 MG/1
40 TABLET ORAL DAILY
COMMUNITY

## 2021-06-07 RX ORDER — FERROUS SULFATE 325(65) MG
325 TABLET ORAL
COMMUNITY

## 2021-06-07 RX ORDER — POTASSIUM CHLORIDE 20 MEQ/1
20 TABLET, EXTENDED RELEASE ORAL DAILY
COMMUNITY

## 2021-06-07 RX ORDER — UMECLIDINIUM BROMIDE AND VILANTEROL TRIFENATATE 62.5; 25 UG/1; UG/1
1 POWDER RESPIRATORY (INHALATION) DAILY
Qty: 1 EACH | Refills: 11 | Status: SHIPPED | OUTPATIENT
Start: 2021-06-07 | End: 2022-07-20

## 2021-06-07 NOTE — PROGRESS NOTES
[facility] [place]  Progress Note  MD Lore Maxwell Jhony   YOB: 1940    Date of Visit:  6/7/2021    Assessment/Plan:  1. COPD, very severe ([address]  Stable  PFT 2016:  INTERPRETATION: Spirometry showed normal FVC of 2.87 L, 81% of predicted,  and decreased FEV1 of 1.02 L, 37% predicted. FEV1/FVC ration was decreased  at 36%. No response to bronchodilators demonstrated on spirometry. Lung volumes showed increased total lung capacity of 157% predicted and  increased residual volume of 236% predicted. Diffusion capacity showed  normal DLCO of 79% predicted. IMPRESSION: Very severe obstructive defect with no response to  bronchodilators on spirometry. Both air trapping and hyperinflation were  present on lung volumes and normal diffusion capacity. Medication Management:  The patient benefits from the medical regimen and reports no complications. Continue:  Anoro (access to medication is an issue 2/2 cost)  Duoneb QID      2. Chronic respiratory failure with hypoxia (HCC)  Stable  O2 sats are acceptable on supplemental O2. The patient benefits from the use of supplemental O2. Use O2 at 3 on his conserver when active and 2 when he is at rest. He is on 2.5 on the concentrator at home. 3. Centrilobular emphysema ([address]  Stable  CT chest 9/19:  IMPRESSION:   1. Mild to moderate emphysema without acute intra pulmonary findings. 2. 6 mm noncalcified nodule within the left lower lobe. While this most   likely represents a benign granuloma, further evaluation and workup is as   suggested per the Fleischner society criteria listed below. This may account   for the previously described outside chest x-ray finding. However, suggest   obtaining and direct comparison with this outside chest x-ray to characterize   the aforementioned pulmonary nodule on outside chest x-ray imaging.        4. Aspiration into Airway  Stable  CT chest 1/20:  [IMPRESSION]      Bands of increased parenchymal density in the right upper lobe and apex,    with significant increased density in the left lower lobe, and posterior    peripheral right lower lobe. Findings are consistent with acute    inflammatory changes/infiltrate. There are associated cystic changes in    the left lower lobe.      Interval significant resolution of patchy density in the anterior left    upper lobe since the prior study.         There are significant diffuse underlying emphysematous changes    bilaterally. Rpeat CT he wants to get it at [facility]    5. Tobacco abuse  Stable  Again had a long discussion about the importance of complete smoking cessation.m  Struggling with quitting. Still smoking but less    FOLLOW UP: 6 months    HPI  The patient presents with a chief complaint of severe shortness of breath related to very severe COPD of many years duration. He has mild associated cough. Exertion is a modifying factor. He feels like his SOB is getting worse. He does use BiPAP at night with sleep. He is on O2 24 hours per day. He ran out of his Anoro. He still smokes. He is SOB even walking a few steps. He fell and broke his back and hip and was in [place] for that a couple months ago. He has had his COVID vaccine. Review of Systems  No Chest pain, Nausea or vomiting reported      No Known Allergies  Prior to Visit Medications    Medication Sig Taking?  Authorizing Provider   potassium chloride (KLOR-CON M) 20 MEQ extended release tablet Take 20 mEq by mouth daily Yes Historical Provider, MD   carvedilol (COREG) 6.25 MG tablet Take 6.25 mg by mouth 2 times daily (with meals) Yes Historical Provider, MD   furosemide (LASIX) 40 MG tablet Take 40 mg by mouth daily Yes Historical Provider, MD   ferrous sulfate (IRON 325) 325 (65 Fe) MG tablet Take 325 mg by mouth daily (with breakfast) Yes Historical Provider, MD   LACTOBACILLUS PO Take by mouth Yes Historical Provider, MD   SITagliptin (JANUVIA) 100 MG kg)   01/02/19 161 lb (73 kg)     BP Readings from Last 3 Encounters:   05/26/20 (!) 100/43   02/19/20 129/77   12/10/19 135/79        Social History     Tobacco Use   Smoking Status Current Every Day Smoker    Packs/day: 1.50    Years: 60.00    Pack years: 90.00   Smokeless Tobacco Never Used       Physical Exam:  Well developed, well nourished  Alert and oriented  Sclera is clear  No cervical adenopathy  No JVD. Chest examination is clear. Cardiac examination reveals regular rate and rhythm without murmur, gallop or rub. The abdomen is soft, nontender and nondistended. There is no clubbing, cyanosis or edema of the extremities. There is no obvious skin rash.   No focal neuro deficicts  Normal mood and affect

## 2022-01-05 ENCOUNTER — VIRTUAL VISIT (OUTPATIENT)
Dept: PULMONOLOGY | Age: 82
End: 2022-01-05
Payer: MEDICARE

## 2022-01-05 DIAGNOSIS — J43.2 CENTRILOBULAR EMPHYSEMA (HCC): ICD-10-CM

## 2022-01-05 DIAGNOSIS — J96.11 CHRONIC RESPIRATORY FAILURE WITH HYPOXIA (HCC): ICD-10-CM

## 2022-01-05 DIAGNOSIS — R91.1 PULMONARY NODULE: ICD-10-CM

## 2022-01-05 DIAGNOSIS — J44.9 COPD, VERY SEVERE (HCC): ICD-10-CM

## 2022-01-05 DIAGNOSIS — R06.02 SHORTNESS OF BREATH: Primary | ICD-10-CM

## 2022-01-05 PROCEDURE — 1123F ACP DISCUSS/DSCN MKR DOCD: CPT | Performed by: INTERNAL MEDICINE

## 2022-01-05 PROCEDURE — 4040F PNEUMOC VAC/ADMIN/RCVD: CPT | Performed by: INTERNAL MEDICINE

## 2022-01-05 PROCEDURE — G8427 DOCREV CUR MEDS BY ELIG CLIN: HCPCS | Performed by: INTERNAL MEDICINE

## 2022-01-05 PROCEDURE — 99214 OFFICE O/P EST MOD 30 MIN: CPT | Performed by: INTERNAL MEDICINE

## 2022-01-05 NOTE — PROGRESS NOTES
[facility]  Progress Note  Abdoul Walsh MD    Pulmonology Video Visit    Pursuant to the emergency declaration under the [facility], [facility] waiver authority and the Coronavirus Preparedness and Response Supplemental Appropriations Act this Video Visit was insisted, with patient's consent, to reduce the patient's risk of exposure to COVID-19 and provide continuity of care for an established patient. The patient was at home, while the provider was at the clinic. Services were provided through a synchronous discussion through a Video Visit to substitute for in-person clinic visit, and coded as such. Bert Bender   YOB: 1940    Date of Visit:  1/5/2022    Assessment/Plan:  1. COPD, very severe ([address]  Stable  PFT 2016:  INTERPRETATION: Spirometry showed normal FVC of 2.87 L, 81% of predicted,  and decreased FEV1 of 1.02 L, 37% predicted. FEV1/FVC ration was decreased  at 36%. No response to bronchodilators demonstrated on spirometry. Lung volumes showed increased total lung capacity of 157% predicted and  increased residual volume of 236% predicted. Diffusion capacity showed  normal DLCO of 79% predicted. IMPRESSION: Very severe obstructive defect with no response to  bronchodilators on spirometry. Both air trapping and hyperinflation were  present on lung volumes and normal diffusion capacity. Anoro (access to medication is an issue 2/2 cost)  Duoneb QID      2. Chronic respiratory failure with hypoxia (HCC)  Stable  O2 sats are acceptable on supplemental O2. The patient benefits from the use of supplemental O2. Use O2 at 3 on his conserver when active and 2 when he is at rest. He is on 2.5 on the concentrator at home. Gets occasional low readings. Have him set it to 3 LPM      3. Centrilobular emphysema ([address]  Stable  CT chest 9/19:  IMPRESSION:   1.  Mild to moderate emphysema without acute intra pulmonary findings. 2. 6 mm noncalcified nodule within the left lower lobe. While this most   likely represents a benign granuloma, further evaluation and workup is as   suggested per the Fleischner society criteria listed below. This may account   for the previously described outside chest x-ray finding. However, suggest   obtaining and direct comparison with this outside chest x-ray to characterize   the aforementioned pulmonary nodule on outside chest x-ray imaging. 4. Aspiration into Airway  Stable  CT chest 1/20:  [IMPRESSION]         Bands of increased parenchymal density in the right upper lobe and apex,    with significant increased density in the left lower lobe, and posterior    peripheral right lower lobe. Findings are consistent with acute    inflammatory changes/infiltrate. There are associated cystic changes in    the left lower lobe.      Interval significant resolution of patchy density in the anterior left    upper lobe since the prior study.         There are significant diffuse underlying emphysematous changes    bilaterally. Rpeat CT he wants to get it at [facility]    5. Tobacco abuse  Stable  Again had a long discussion about the importance of complete smoking cessation.m  Struggling with quitting. One pack per day    FOLLOW UP: 6 months    HPI  The patient presents with a chief complaint of severe shortness of breath related to very severe COPD of many years duration. He has mild associated cough. Exertion is a modifying factor. He feels like his SOB is getting worse. He does use BiPAP at night with sleep. He is on O2 24 hours per day. He ran out of his Anoro. He still smokes. He gets SOB just with eating. Review of Systems  No Chest pain, Nausea or vomiting reported      No Known Allergies  Prior to Visit Medications    Medication Sig Taking?  Authorizing Provider   potassium chloride (KLOR-CON M) 20 MEQ extended release tablet Take 20 mEq by mouth daily  Historical Provider, (FLOMAX) 0.4 MG capsule Take 1 capsule by mouth daily  Historical Provider, MD   OXYGEN Inhale into the lungs every 24 hours At night. Historical Provider, MD       There were no vitals filed for this visit. There is no height or weight on file to calculate BMI.      Wt Readings from Last 3 Encounters:   05/26/20 136 lb (61.7 kg)   06/17/19 152 lb (68.9 kg)   01/02/19 161 lb (73 kg)     BP Readings from Last 3 Encounters:   05/26/20 (!) 100/43   02/19/20 129/77   12/10/19 135/79        Social History     Tobacco Use   Smoking Status Current Every Day Smoker    Packs/day: 1.50    Years: 60.00    Pack years: 90.00   Smokeless Tobacco Never Used       Physical Exam:    Video visit

## 2022-07-20 RX ORDER — UMECLIDINIUM BROMIDE AND VILANTEROL TRIFENATATE 62.5; 25 UG/1; UG/1
POWDER RESPIRATORY (INHALATION)
Qty: 60 EACH | Refills: 1 | Status: SHIPPED | OUTPATIENT
Start: 2022-07-20

## 2022-08-02 ENCOUNTER — OFFICE VISIT (OUTPATIENT)
Dept: PULMONOLOGY | Age: 82
End: 2022-08-02
Payer: MEDICARE

## 2022-08-02 VITALS — HEART RATE: 81 BPM | OXYGEN SATURATION: 98 %

## 2022-08-02 DIAGNOSIS — J44.9 COPD, VERY SEVERE (HCC): Primary | ICD-10-CM

## 2022-08-02 DIAGNOSIS — J43.2 CENTRILOBULAR EMPHYSEMA (HCC): ICD-10-CM

## 2022-08-02 DIAGNOSIS — J96.11 CHRONIC RESPIRATORY FAILURE WITH HYPOXIA (HCC): ICD-10-CM

## 2022-08-02 DIAGNOSIS — R91.1 PULMONARY NODULE: ICD-10-CM

## 2022-08-02 PROCEDURE — 3023F SPIROM DOC REV: CPT | Performed by: INTERNAL MEDICINE

## 2022-08-02 PROCEDURE — 4004F PT TOBACCO SCREEN RCVD TLK: CPT | Performed by: INTERNAL MEDICINE

## 2022-08-02 PROCEDURE — 1123F ACP DISCUSS/DSCN MKR DOCD: CPT | Performed by: INTERNAL MEDICINE

## 2022-08-02 PROCEDURE — G8421 BMI NOT CALCULATED: HCPCS | Performed by: INTERNAL MEDICINE

## 2022-08-02 PROCEDURE — G8427 DOCREV CUR MEDS BY ELIG CLIN: HCPCS | Performed by: INTERNAL MEDICINE

## 2022-08-02 PROCEDURE — 99214 OFFICE O/P EST MOD 30 MIN: CPT | Performed by: INTERNAL MEDICINE

## 2022-08-02 NOTE — PROGRESS NOTES
[facility]  Progress Note  MD Masoud Conteh   YOB: 1940    Date of Visit:  8/2/2022    Assessment/Plan:  1. COPD, very severe ([address]  Stable  PFT 2016:  INTERPRETATION: Spirometry showed normal FVC of 2.87 L, 81% of predicted,  and decreased FEV1 of 1.02 L, 37% predicted. FEV1/FVC ration was decreased  at 36%. No response to bronchodilators demonstrated on spirometry. Lung volumes showed increased total lung capacity of 157% predicted and  increased residual volume of 236% predicted. Diffusion capacity showed  normal DLCO of 79% predicted. IMPRESSION: Very severe obstructive defect with no response to  bronchodilators on spirometry. Both air trapping and hyperinflation were  present on lung volumes and normal diffusion capacity. Medication Management:  The patient benefits from the medical regimen and reports no complications. Continue:  Anoro (access to medication is an issue 2/2 cost) ==> Trelegy  Duoneb QID      2. Chronic respiratory failure with hypoxia (HCC)  Stable  O2 sats are acceptable on supplemental O2. The patient benefits from the use of supplemental O2. Use O2 at 3 on his conserver when active and 2 when he is at rest. He is on 2.5 on the concentrator at home. 3. Centrilobular emphysema ([address]  Stable  CT chest 9/19:  IMPRESSION:   1. Mild to moderate emphysema without acute intra pulmonary findings. 2. 6 mm noncalcified nodule within the left lower lobe. While this most   likely represents a benign granuloma, further evaluation and workup is as   suggested per the Fleischner society criteria listed below. This may account   for the previously described outside chest x-ray finding. However, suggest   obtaining and direct comparison with this outside chest x-ray to characterize   the aforementioned pulmonary nodule on outside chest x-ray imaging. No recent imaging.   Did have PN and has a risk for CA  Recommend repeat CT chest.    4. Tobacco abuse  Stable  Again had a long discussion about the importance of complete smoking cessation.m  Struggling with quitting. Still smoking but less    FOLLOW UP: 6 months    HPI  The patient presents with a chief complaint of severe shortness of breath related to very severe COPD of many years duration. He has mild associated cough. Exertion is a modifying factor. He feels like his SOB is getting worse. He does use BiPAP at night with sleep. He is on O2 24 hours per day. He ran out of his Anoro. He still smokes. He is SOB even walking a few steps. Review of Systems  No Chest pain, Nausea or vomiting reported      No Known Allergies  Prior to Visit Medications    Medication Sig Taking? Authorizing Provider   ANORO ELLIPTA 62.5-25 MCG/INH AEPB inhaler INHALE 1 PUFF BY MOUTH INTO LUNGS EVERY DAY  Aurea Rangel MD   potassium chloride (KLOR-CON M) 20 MEQ extended release tablet Take 20 mEq by mouth daily  Historical Provider, MD   carvedilol (COREG) 6.25 MG tablet Take 6.25 mg by mouth 2 times daily (with meals)  Historical Provider, MD   furosemide (LASIX) 40 MG tablet Take 40 mg by mouth daily  Historical Provider, MD   ferrous sulfate (IRON 325) 325 (65 Fe) MG tablet Take 325 mg by mouth daily (with breakfast)  Historical Provider, MD   LACTOBACILLUS PO Take by mouth  Historical Provider, MD   SITagliptin (JANUVIA) 100 MG tablet Take 100 mg by mouth daily  Historical Provider, MD   ipratropium-albuterol (DUONEB) 0.5-2.5 (3) MG/3ML SOLN nebulizer solution USE 1 VIAL IN NEBULIZER 4 TIMES DAILY. Generic: Vika Pandey MD   albuterol sulfate  (90 Base) MCG/ACT inhaler Inhale 2 puffs into the lungs every 6 hours as needed for Wheezing  Aurea Rangel MD   HYDROcodone-acetaminophen (NORCO) 5-325 MG per tablet Take 1 tablet by mouth every 6 hours as needed for Pain.   Historical Provider, MD   Insulin Aspart (NOVOLOG FLEXPEN SC) Inject into the skin Historical Provider, MD   aspirin 325 MG tablet Take 325 mg by mouth daily  Historical Provider, MD   niacin 500 MG extended release capsule Take 500 mg by mouth nightly  Historical Provider, MD   albuterol sulfate HFA (PROAIR HFA) 108 (90 Base) MCG/ACT inhaler Inhale 2 puffs into the lungs every 6 hours as needed for Wheezing  Ailsa Tucker MD   pantoprazole (PROTONIX) 40 MG tablet Take 40 mg by mouth 2 times daily   Historical Provider, MD   metFORMIN (GLUCOPHAGE) 500 MG tablet Take 500 mg by mouth 2 times daily (with meals)  Historical Provider, MD   rosuvastatin (CRESTOR) 20 MG tablet Take 20 mg by mouth daily  Historical Provider, MD   ipratropium-albuterol (DUONEB) 0.5-2.5 (3) MG/3ML SOLN nebulizer solution Inhale 3 mLs into the lungs every 4 hours as needed for Shortness of Breath DX:COPD J44.9  Alisa Tucker MD   tamsulosin (FLOMAX) 0.4 MG capsule Take 1 capsule by mouth daily  Historical Provider, MD   OXYGEN Inhale into the lungs every 24 hours At night. Historical Provider, MD       Vitals:    08/02/22 1429   Pulse: 81   SpO2: 98%     There is no height or weight on file to calculate BMI. Wt Readings from Last 3 Encounters:   05/26/20 136 lb (61.7 kg)   06/17/19 152 lb (68.9 kg)   01/02/19 161 lb (73 kg)     BP Readings from Last 3 Encounters:   05/26/20 (!) 100/43   02/19/20 129/77   12/10/19 135/79        Social History     Tobacco Use   Smoking Status Every Day    Packs/day: 1.50    Years: 60.00    Pack years: 90.00    Types: Cigarettes   Smokeless Tobacco Never       Physical Exam:  Well developed, well nourished  Alert and oriented  Sclera is clear  No cervical adenopathy  No JVD. Chest examination is clear. Cardiac examination reveals regular rate and rhythm without murmur, gallop or rub. The abdomen is soft, nontender and nondistended. There is no clubbing, cyanosis or edema of the extremities. There is no obvious skin rash.   No focal neuro deficicts  Normal mood and affect

## 2022-08-26 ENCOUNTER — TELEPHONE (OUTPATIENT)
Dept: PULMONOLOGY | Age: 82
End: 2022-08-26

## 2022-08-26 NOTE — TELEPHONE ENCOUNTER
Pt's wife left voice mail asking about other O2 options, as the poc is not working, he needs continues air, but has to be able to travel.      # [phone]

## 2022-08-26 NOTE — TELEPHONE ENCOUNTER
Called and spoke with Mrs Iva Mera pt is breathing more from his mouth then nose so conserving device is not working Explained to her that he can get E tanks with continuous flow She will discuss with pt and call us back to let us know what he decides

## 2023-02-03 ENCOUNTER — OFFICE VISIT (OUTPATIENT)
Dept: PULMONOLOGY | Age: 83
End: 2023-02-03

## 2023-02-03 VITALS — OXYGEN SATURATION: 97 %

## 2023-02-03 DIAGNOSIS — J43.2 CENTRILOBULAR EMPHYSEMA (HCC): ICD-10-CM

## 2023-02-03 DIAGNOSIS — J96.11 CHRONIC RESPIRATORY FAILURE WITH HYPOXIA (HCC): ICD-10-CM

## 2023-02-03 DIAGNOSIS — J44.9 COPD, VERY SEVERE (HCC): Primary | ICD-10-CM

## 2023-02-03 DIAGNOSIS — Z72.0 TOBACCO ABUSE: ICD-10-CM

## 2023-02-03 RX ORDER — PREDNISONE 10 MG/1
10 TABLET ORAL DAILY
Qty: 30 TABLET | Refills: 11 | Status: SHIPPED | OUTPATIENT
Start: 2023-02-03 | End: 2023-02-13

## 2023-02-03 NOTE — PROGRESS NOTES
[facility]  Progress Note  Reno Razo MD      Marga Ace   YOB: 1940    Date of Visit:  2/3/2023    Assessment/Plan:  1. COPD, very severe ([address]  Worse  PFT 2016:  INTERPRETATION: Spirometry showed normal FVC of 2.87 L, 81% of predicted,  and decreased FEV1 of 1.02 L, 37% predicted. FEV1/FVC ration was decreased  at 36%. No response to bronchodilators demonstrated on spirometry. Lung volumes showed increased total lung capacity of 157% predicted and  increased residual volume of 236% predicted. Diffusion capacity showed  normal DLCO of 79% predicted. IMPRESSION: Very severe obstructive defect with no response to  bronchodilators on spirometry. Both air trapping and hyperinflation were  present on lung volumes and normal diffusion capacity. Medication Management:  The patient benefits from the medical regimen and reports no complications. Continue:  Anoro (access to medication is an issue 2/2 cost) ==> Trelegy. He never got this. I will send a new prescription for him  Duoneb QID  Try him on Prednisone 10 mg per day. 2. Chronic respiratory failure with hypoxia (HCC)  Stable  He was 84% on his POC set to 4 when he came to the office. He is on 3.5 LPM at home. He was on 4 continuous in the office and his sats are ok. Increase his O2 to 4. Get him an overnight oximetry on O2 + BiPAP. He may need to go to a high flow concentrator. 3. Centrilobular emphysema ([address]  Stable  CT chest 9/19:  IMPRESSION:   1. Mild to moderate emphysema without acute intra pulmonary findings. 2. 6 mm noncalcified nodule within the left lower lobe. While this most   likely represents a benign granuloma, further evaluation and workup is as   suggested per the Fleischner society criteria listed below. This may account   for the previously described outside chest x-ray finding.  However, suggest   obtaining and direct comparison with this outside chest x-ray to characterize   the aforementioned pulmonary nodule on outside chest x-ray imaging. No recent imaging. Did have PN and has a risk for CA  Recommend repeat CT chest ==> He did not get this done. 4. Tobacco abuse  Stable  Again had a long discussion about the importance of complete smoking cessation.m  Struggling with quitting. Still smoking but less    FOLLOW UP: 6 months    HPI  The patient presents with a chief complaint of severe shortness of breath related to very severe COPD of many years duration. He has mild associated cough. Exertion is a modifying factor. He is more SOB and sounds congested. He is not taking the Trelegy. He is back on Anoro. He does use BiPAP at night with sleep. He is on O2 24 hours per day. He still smokes. He is SOB even walking a few steps. Review of Systems  No Chest pain, Nausea or vomiting reported      No Known Allergies  Prior to Visit Medications    Medication Sig Taking? Authorizing Provider   ANORO ELLIPTA 62.5-25 MCG/INH AEPB inhaler INHALE 1 PUFF INTO LUNGS EVERY DAY  Alejandro Starks MD   potassium chloride (KLOR-CON M) 20 MEQ extended release tablet Take 20 mEq by mouth daily  Historical Provider, MD   carvedilol (COREG) 6.25 MG tablet Take 6.25 mg by mouth 2 times daily (with meals)  Historical Provider, MD   furosemide (LASIX) 40 MG tablet Take 40 mg by mouth daily  Historical Provider, MD   ferrous sulfate (IRON 325) 325 (65 Fe) MG tablet Take 325 mg by mouth daily (with breakfast)  Historical Provider, MD   LACTOBACILLUS PO Take by mouth  Historical Provider, MD   SITagliptin (JANUVIA) 100 MG tablet Take 100 mg by mouth daily  Historical Provider, MD   ipratropium-albuterol (DUONEB) 0.5-2.5 (3) MG/3ML SOLN nebulizer solution USE 1 VIAL IN NEBULIZER 4 TIMES DAILY.  Generic: Nitza Eason MD   albuterol sulfate  (90 Base) MCG/ACT inhaler Inhale 2 puffs into the lungs every 6 hours as needed for Wheezing  Alejandro Starks MD HYDROcodone-acetaminophen (NORCO) 5-325 MG per tablet Take 1 tablet by mouth every 6 hours as needed for Pain. Historical Provider, MD   Insulin Aspart (NOVOLOG FLEXPEN SC) Inject into the skin  Historical Provider, MD   aspirin 325 MG tablet Take 325 mg by mouth daily  Historical Provider, MD   niacin 500 MG extended release capsule Take 500 mg by mouth nightly  Historical Provider, MD   albuterol sulfate HFA (PROAIR HFA) 108 (90 Base) MCG/ACT inhaler Inhale 2 puffs into the lungs every 6 hours as needed for Wheezing  Darryn Alvares MD   pantoprazole (PROTONIX) 40 MG tablet Take 40 mg by mouth 2 times daily   Historical Provider, MD   metFORMIN (GLUCOPHAGE) 500 MG tablet Take 500 mg by mouth 2 times daily (with meals)  Historical Provider, MD   rosuvastatin (CRESTOR) 20 MG tablet Take 20 mg by mouth daily  Historical Provider, MD   ipratropium-albuterol (DUONEB) 0.5-2.5 (3) MG/3ML SOLN nebulizer solution Inhale 3 mLs into the lungs every 4 hours as needed for Shortness of Breath DX:COPD J44.9  Darryn Alvares MD   tamsulosin (FLOMAX) 0.4 MG capsule Take 1 capsule by mouth daily  Historical Provider, MD   OXYGEN Inhale into the lungs every 24 hours At night. Historical Provider, MD       Vitals:    02/03/23 1516   SpO2: 97%     There is no height or weight on file to calculate BMI. Wt Readings from Last 3 Encounters:   05/26/20 136 lb (61.7 kg)   06/17/19 152 lb (68.9 kg)   01/02/19 161 lb (73 kg)     BP Readings from Last 3 Encounters:   05/26/20 (!) 100/43   02/19/20 129/77   12/10/19 135/79        Social History     Tobacco Use   Smoking Status Every Day    Packs/day: 1.50    Years: 60.00    Pack years: 90.00    Types: Cigarettes   Smokeless Tobacco Never       Physical Exam:  Well developed, well nourished  Alert and oriented  Sclera is clear  No cervical adenopathy  No JVD. Chest examination is clear. Cardiac examination reveals regular rate and rhythm without murmur, gallop or rub.   The abdomen is soft, nontender and nondistended. There is no clubbing, cyanosis or edema of the extremities. There is no obvious skin rash.   No focal neuro deficicts  Normal mood and affect

## 2023-03-07 ENCOUNTER — TELEPHONE (OUTPATIENT)
Dept: PULMONOLOGY | Age: 83
End: 2023-03-07

## 2023-03-07 NOTE — TELEPHONE ENCOUNTER
Pt's wife called and wants to know if pt should continue to take the prednisone.     Ph # [phone]

## 2023-03-07 NOTE — TELEPHONE ENCOUNTER
Called to see if pt felt the Prednisone has helped and he stated it has so he is going to continue this and has refills

## 2023-04-06 ENCOUNTER — TELEPHONE (OUTPATIENT)
Dept: PULMONOLOGY | Age: 83
End: 2023-04-06

## 2023-04-06 NOTE — TELEPHONE ENCOUNTER
Chris with [employer] called and said they received an oxygen order and it looks to be an overnight and needs to be a walk test for POC.      PH: Huey Stein

## 2023-06-02 ENCOUNTER — OFFICE VISIT (OUTPATIENT)
Dept: PULMONOLOGY | Age: 83
End: 2023-06-02

## 2023-06-02 DIAGNOSIS — Z72.0 TOBACCO ABUSE: ICD-10-CM

## 2023-06-02 DIAGNOSIS — J44.9 COPD, VERY SEVERE (HCC): Primary | ICD-10-CM

## 2023-06-02 DIAGNOSIS — J96.11 CHRONIC RESPIRATORY FAILURE WITH HYPOXIA (HCC): ICD-10-CM

## 2023-06-02 DIAGNOSIS — J43.2 CENTRILOBULAR EMPHYSEMA (HCC): ICD-10-CM

## 2023-06-02 NOTE — PROGRESS NOTES
[facility]  Progress Note  Shani Shelton MD      Scottie Hull   YOB: 1940    Date of Visit:  6/2/2023    Assessment/Plan:  1. COPD, very severe ([address]  Worse  PFT 2016:  INTERPRETATION: Spirometry showed normal FVC of 2.87 L, 81% of predicted,  and decreased FEV1 of 1.02 L, 37% predicted. FEV1/FVC ration was decreased  at 36%. No response to bronchodilators demonstrated on spirometry. Lung volumes showed increased total lung capacity of 157% predicted and  increased residual volume of 236% predicted. Diffusion capacity showed  normal DLCO of 79% predicted. IMPRESSION: Very severe obstructive defect with no response to  bronchodilators on spirometry. Both air trapping and hyperinflation were  present on lung volumes and normal diffusion capacity. Medication Management:  The patient benefits from the medical regimen and reports no complications. Continue:  Anoro (access to medication is an issue 2/2 cost) ==> Trelegy. He never got this. I will send a new prescription for him  Duoneb QID  Try him on Prednisone 10 mg per day. 2. Chronic respiratory failure with hypoxia (HCC)  Stable  He is 80% at rest on RA  He needs 5-6 LPM at home, 24 hr/d  He will benefit from a high flow concentrator. 3. Centrilobular emphysema ([address]  Stable  CT chest 9/19:  IMPRESSION:   1. Mild to moderate emphysema without acute intra pulmonary findings. 2. 6 mm noncalcified nodule within the left lower lobe. While this most   likely represents a benign granuloma, further evaluation and workup is as   suggested per the Fleischner society criteria listed below. This may account   for the previously described outside chest x-ray finding. However, suggest   obtaining and direct comparison with this outside chest x-ray to characterize   the aforementioned pulmonary nodule on outside chest x-ray imaging. No recent imaging.   Did have PN and has a risk for

## 2023-08-05 SDOH — HEALTH STABILITY: PHYSICAL HEALTH: ON AVERAGE, HOW MANY DAYS PER WEEK DO YOU ENGAGE IN MODERATE TO STRENUOUS EXERCISE (LIKE A BRISK WALK)?: 0 DAYS

## 2023-08-07 ENCOUNTER — OFFICE VISIT (OUTPATIENT)
Dept: INTERNAL MEDICINE CLINIC | Age: 83
End: 2023-08-07
Payer: MEDICARE

## 2023-08-07 VITALS
HEIGHT: 69 IN | WEIGHT: 116 LBS | SYSTOLIC BLOOD PRESSURE: 100 MMHG | OXYGEN SATURATION: 100 % | HEART RATE: 68 BPM | DIASTOLIC BLOOD PRESSURE: 62 MMHG | BODY MASS INDEX: 17.18 KG/M2

## 2023-08-07 DIAGNOSIS — E83.42 HYPOMAGNESEMIA: ICD-10-CM

## 2023-08-07 DIAGNOSIS — E11.9 TYPE 2 DIABETES MELLITUS WITHOUT COMPLICATION, WITH LONG-TERM CURRENT USE OF INSULIN (HCC): ICD-10-CM

## 2023-08-07 DIAGNOSIS — J44.9 COPD, VERY SEVERE (HCC): ICD-10-CM

## 2023-08-07 DIAGNOSIS — L89.151 PRESSURE INJURY OF SACRAL REGION, STAGE 1: ICD-10-CM

## 2023-08-07 DIAGNOSIS — E87.6 HYPOKALEMIA: ICD-10-CM

## 2023-08-07 DIAGNOSIS — Z87.898 HISTORY OF ELEVATED PSA: ICD-10-CM

## 2023-08-07 DIAGNOSIS — R63.4 WEIGHT LOSS, UNINTENTIONAL: ICD-10-CM

## 2023-08-07 DIAGNOSIS — S81.812A NONINFECTED SKIN TEAR OF LEFT LOWER EXTREMITY, INITIAL ENCOUNTER: ICD-10-CM

## 2023-08-07 DIAGNOSIS — I10 ESSENTIAL HYPERTENSION: ICD-10-CM

## 2023-08-07 DIAGNOSIS — R63.0 ANOREXIA: ICD-10-CM

## 2023-08-07 DIAGNOSIS — K21.9 GASTROESOPHAGEAL REFLUX DISEASE WITHOUT ESOPHAGITIS: ICD-10-CM

## 2023-08-07 DIAGNOSIS — Z76.89 ENCOUNTER TO ESTABLISH CARE: Primary | ICD-10-CM

## 2023-08-07 DIAGNOSIS — G89.4 CHRONIC PAIN SYNDROME: ICD-10-CM

## 2023-08-07 DIAGNOSIS — Z72.0 TOBACCO ABUSE: ICD-10-CM

## 2023-08-07 DIAGNOSIS — Z79.4 TYPE 2 DIABETES MELLITUS WITHOUT COMPLICATION, WITH LONG-TERM CURRENT USE OF INSULIN (HCC): ICD-10-CM

## 2023-08-07 DIAGNOSIS — E78.2 MIXED HYPERLIPIDEMIA: ICD-10-CM

## 2023-08-07 PROBLEM — T17.908A ASPIRATION INTO AIRWAY: Status: RESOLVED | Noted: 2020-02-19 | Resolved: 2023-08-07

## 2023-08-07 PROCEDURE — G8427 DOCREV CUR MEDS BY ELIG CLIN: HCPCS

## 2023-08-07 PROCEDURE — G8419 CALC BMI OUT NRM PARAM NOF/U: HCPCS

## 2023-08-07 PROCEDURE — 1123F ACP DISCUSS/DSCN MKR DOCD: CPT

## 2023-08-07 PROCEDURE — 3074F SYST BP LT 130 MM HG: CPT

## 2023-08-07 PROCEDURE — 4004F PT TOBACCO SCREEN RCVD TLK: CPT

## 2023-08-07 PROCEDURE — 99204 OFFICE O/P NEW MOD 45 MIN: CPT

## 2023-08-07 PROCEDURE — 3078F DIAST BP <80 MM HG: CPT

## 2023-08-07 PROCEDURE — 3023F SPIROM DOC REV: CPT

## 2023-08-07 RX ORDER — CALCIUM CARBONATE/VITAMIN D3 500-10/5ML
400 LIQUID (ML) ORAL DAILY
Qty: 30 CAPSULE | Refills: 3 | Status: SHIPPED | OUTPATIENT
Start: 2023-08-07

## 2023-08-07 RX ORDER — ROSUVASTATIN CALCIUM 20 MG/1
20 TABLET, COATED ORAL DAILY
Qty: 30 TABLET | Refills: 3 | Status: SHIPPED | OUTPATIENT
Start: 2023-08-07

## 2023-08-07 RX ORDER — CALCIUM CARBONATE/VITAMIN D3 500-10/5ML
400 LIQUID (ML) ORAL DAILY
COMMUNITY
End: 2023-08-07 | Stop reason: SDUPTHER

## 2023-08-07 RX ORDER — PANTOPRAZOLE SODIUM 40 MG/1
40 TABLET, DELAYED RELEASE ORAL 2 TIMES DAILY
Qty: 60 TABLET | Refills: 3 | Status: SHIPPED | OUTPATIENT
Start: 2023-08-07

## 2023-08-07 RX ORDER — TAMSULOSIN HYDROCHLORIDE 0.4 MG/1
0.4 CAPSULE ORAL DAILY
Qty: 30 CAPSULE | Refills: 3 | Status: SHIPPED | OUTPATIENT
Start: 2023-08-07

## 2023-08-07 RX ORDER — POTASSIUM CHLORIDE 20 MEQ/1
20 TABLET, EXTENDED RELEASE ORAL DAILY
Qty: 30 TABLET | Refills: 3 | Status: SHIPPED | OUTPATIENT
Start: 2023-08-07

## 2023-08-07 SDOH — ECONOMIC STABILITY: HOUSING INSECURITY
IN THE LAST 12 MONTHS, WAS THERE A TIME WHEN YOU DID NOT HAVE A STEADY PLACE TO SLEEP OR SLEPT IN A SHELTER (INCLUDING NOW)?: NO

## 2023-08-07 SDOH — ECONOMIC STABILITY: FOOD INSECURITY: WITHIN THE PAST 12 MONTHS, THE FOOD YOU BOUGHT JUST DIDN'T LAST AND YOU DIDN'T HAVE MONEY TO GET MORE.: NEVER TRUE

## 2023-08-07 SDOH — ECONOMIC STABILITY: FOOD INSECURITY: WITHIN THE PAST 12 MONTHS, YOU WORRIED THAT YOUR FOOD WOULD RUN OUT BEFORE YOU GOT MONEY TO BUY MORE.: NEVER TRUE

## 2023-08-07 SDOH — ECONOMIC STABILITY: INCOME INSECURITY: HOW HARD IS IT FOR YOU TO PAY FOR THE VERY BASICS LIKE FOOD, HOUSING, MEDICAL CARE, AND HEATING?: NOT HARD AT ALL

## 2023-08-07 ASSESSMENT — PATIENT HEALTH QUESTIONNAIRE - PHQ9
SUM OF ALL RESPONSES TO PHQ QUESTIONS 1-9: 0
SUM OF ALL RESPONSES TO PHQ9 QUESTIONS 1 & 2: 0
SUM OF ALL RESPONSES TO PHQ QUESTIONS 1-9: 0
2. FEELING DOWN, DEPRESSED OR HOPELESS: 0
SUM OF ALL RESPONSES TO PHQ QUESTIONS 1-9: 0
1. LITTLE INTEREST OR PLEASURE IN DOING THINGS: 0
SUM OF ALL RESPONSES TO PHQ QUESTIONS 1-9: 0

## 2023-08-07 ASSESSMENT — ENCOUNTER SYMPTOMS
NAUSEA: 0
SHORTNESS OF BREATH: 1
ANAL BLEEDING: 0
VOMITING: 0
ABDOMINAL PAIN: 0
BLOOD IN STOOL: 0

## 2023-08-07 NOTE — ASSESSMENT & PLAN NOTE
Reports long-standing history of chronic back pain. Typically gets 90 tablets/90 days. I explained to patient that I don't prescribe chronic pain medications. Referral to pain management placed.

## 2023-08-07 NOTE — ASSESSMENT & PLAN NOTE
Discussed importance of smoking cessation on his health. Patient reports not ready to quit at this time.

## 2023-08-07 NOTE — ASSESSMENT & PLAN NOTE
Per prior PCPs note - patient has refused to see urology, have prostate examination for this. PSA level ordered today. Refill for flomax placed.

## 2023-08-07 NOTE — ASSESSMENT & PLAN NOTE
Reports managed by cardiology, Dr Manpreet Moody. /62 in the office today. Exam doesn't warrant change in plan. Continue to follow with cardiology.

## 2023-08-07 NOTE — ASSESSMENT & PLAN NOTE
No surrounding erythema/edema. Healthy tissue noted. Instructed to wash with warm soapy water daily. Pat dry.

## 2023-08-07 NOTE — ASSESSMENT & PLAN NOTE
Came with paperwork from prior PCP, need to review, skimmed over during visit to support what patient noted. Need for 1 week follow-up given multiple chronic health concerns and addition to concerning symptoms. Blood work ordered for when he is fasting.

## 2023-08-07 NOTE — ASSESSMENT & PLAN NOTE
Reviewed importance of off-loading pressure from sacral area with pillows or by turning on the side. Noted white flaky dime-sized healing would to coccyx, patients spouse reports area has been healing well.

## 2023-08-07 NOTE — ASSESSMENT & PLAN NOTE
Takes metformin 500 mg daily and tolerates well. Only checking blood sugar when he feels symptomatic - states this has been ok per prior PCP for quite some time, however Novolog ordered TID? ? Patient reports has been  for awhile and hasn't needed to use due to decreased eating. Check A1C with labs. Follow-up insulin treatment plan at 1 week follow-up, hold for now. If you have blood sugar >200 notify office. Continue metformin. Refill ordered.

## 2023-08-07 NOTE — ASSESSMENT & PLAN NOTE
Managed per pulmonology, Dr Martinez President. Last visit 6/2/23 with noted worsening of COPD. Was started on Trelegy once daily and one month of prednisone 10 mg daily, continued ventolin QID. Encouraged to continue follow-up as discussed at last appointment.

## 2023-08-08 ENCOUNTER — TELEPHONE (OUTPATIENT)
Dept: PULMONOLOGY | Age: 83
End: 2023-08-08

## 2023-08-08 RX ORDER — FLUTICASONE FUROATE, UMECLIDINIUM BROMIDE AND VILANTEROL TRIFENATATE 200; 62.5; 25 UG/1; UG/1; UG/1
1 POWDER RESPIRATORY (INHALATION) DAILY
Qty: 180 EACH | Refills: 3 | Status: SHIPPED | OUTPATIENT
Start: 2023-08-08

## 2023-08-08 RX ORDER — IPRATROPIUM BROMIDE AND ALBUTEROL SULFATE 2.5; .5 MG/3ML; MG/3ML
1 SOLUTION RESPIRATORY (INHALATION) EVERY 4 HOURS PRN
Qty: 360 ML | Refills: 11 | Status: SHIPPED | OUTPATIENT
Start: 2023-08-08

## 2023-08-08 RX ORDER — ALBUTEROL SULFATE 90 UG/1
2 AEROSOL, METERED RESPIRATORY (INHALATION) EVERY 6 HOURS PRN
Qty: 1 EACH | Refills: 11 | Status: SHIPPED | OUTPATIENT
Start: 2023-08-08 | End: 2024-08-07

## 2023-08-08 NOTE — TELEPHONE ENCOUNTER
Patients wife left VM and wanted to know why patients in [facility] were canceled.        [id number] [address]: [phone]

## 2023-09-26 ENCOUNTER — TELEPHONE (OUTPATIENT)
Dept: PULMONOLOGY | Age: 83
End: 2023-09-26

## 2023-09-26 NOTE — TELEPHONE ENCOUNTER
Dr. Jc Buys patients cardiologist called and would like to speak with  regarding patients lungs and a possible procedure of putting in a pacemaker.      Cell Number: [phone]

## 2023-10-17 ENCOUNTER — TELEPHONE (OUTPATIENT)
Dept: INTERNAL MEDICINE CLINIC | Age: 83
End: 2023-10-17

## 2023-10-17 NOTE — TELEPHONE ENCOUNTER
Called patient to schedule follow up and AWV with PCP. Spouse answered call and was informed by her that patient is on Hosp[facility] and they have taken over care of patient.